# Patient Record
Sex: FEMALE | Race: BLACK OR AFRICAN AMERICAN | NOT HISPANIC OR LATINO | Employment: STUDENT | URBAN - METROPOLITAN AREA
[De-identification: names, ages, dates, MRNs, and addresses within clinical notes are randomized per-mention and may not be internally consistent; named-entity substitution may affect disease eponyms.]

---

## 2024-05-09 ENCOUNTER — HOSPITAL ENCOUNTER (EMERGENCY)
Facility: HOSPITAL | Age: 19
Discharge: HOME | End: 2024-05-09
Attending: EMERGENCY MEDICINE
Payer: COMMERCIAL

## 2024-05-09 ENCOUNTER — APPOINTMENT (EMERGENCY)
Dept: RADIOLOGY | Facility: HOSPITAL | Age: 19
End: 2024-05-09
Payer: COMMERCIAL

## 2024-05-09 VITALS
DIASTOLIC BLOOD PRESSURE: 62 MMHG | TEMPERATURE: 98.2 F | SYSTOLIC BLOOD PRESSURE: 128 MMHG | RESPIRATION RATE: 18 BRPM | OXYGEN SATURATION: 100 % | HEART RATE: 66 BPM

## 2024-05-09 DIAGNOSIS — R10.13 EPIGASTRIC PAIN: Primary | ICD-10-CM

## 2024-05-09 LAB
ALBUMIN SERPL BCP-MCNC: 4.3 G/DL (ref 3.5–5)
ALP SERPL-CCNC: 58 U/L (ref 34–104)
ALT SERPL W P-5'-P-CCNC: 8 U/L (ref 7–52)
ANION GAP SERPL CALCULATED.3IONS-SCNC: 7 MMOL/L (ref 4–13)
AST SERPL W P-5'-P-CCNC: 22 U/L (ref 13–39)
BASOPHILS # BLD AUTO: 0.03 THOUSANDS/ÂΜL (ref 0–0.1)
BASOPHILS NFR BLD AUTO: 0 % (ref 0–1)
BILIRUB SERPL-MCNC: 0.31 MG/DL (ref 0.2–1)
BILIRUB UR QL STRIP: NEGATIVE
BUN SERPL-MCNC: 9 MG/DL (ref 5–25)
CALCIUM SERPL-MCNC: 9.2 MG/DL (ref 8.4–10.2)
CHLORIDE SERPL-SCNC: 104 MMOL/L (ref 96–108)
CLARITY UR: CLEAR
CO2 SERPL-SCNC: 26 MMOL/L (ref 21–32)
COLOR UR: NORMAL
CREAT SERPL-MCNC: 0.71 MG/DL (ref 0.6–1.3)
EOSINOPHIL # BLD AUTO: 0.05 THOUSAND/ÂΜL (ref 0–0.61)
EOSINOPHIL NFR BLD AUTO: 1 % (ref 0–6)
ERYTHROCYTE [DISTWIDTH] IN BLOOD BY AUTOMATED COUNT: 13 % (ref 11.6–15.1)
EXT PREGNANCY TEST URINE: NEGATIVE
EXT. CONTROL: NORMAL
GFR SERPL CREATININE-BSD FRML MDRD: 123 ML/MIN/1.73SQ M
GLUCOSE SERPL-MCNC: 73 MG/DL (ref 65–140)
GLUCOSE UR STRIP-MCNC: NEGATIVE MG/DL
HCT VFR BLD AUTO: 42 % (ref 34.8–46.1)
HGB BLD-MCNC: 13.8 G/DL (ref 11.5–15.4)
HGB UR QL STRIP.AUTO: NEGATIVE
IMM GRANULOCYTES # BLD AUTO: 0.03 THOUSAND/UL (ref 0–0.2)
IMM GRANULOCYTES NFR BLD AUTO: 0 % (ref 0–2)
KETONES UR STRIP-MCNC: NEGATIVE MG/DL
LEUKOCYTE ESTERASE UR QL STRIP: NEGATIVE
LIPASE SERPL-CCNC: 18 U/L (ref 11–82)
LYMPHOCYTES # BLD AUTO: 1.93 THOUSANDS/ÂΜL (ref 0.6–4.47)
LYMPHOCYTES NFR BLD AUTO: 21 % (ref 14–44)
MCH RBC QN AUTO: 30.5 PG (ref 26.8–34.3)
MCHC RBC AUTO-ENTMCNC: 32.9 G/DL (ref 31.4–37.4)
MCV RBC AUTO: 93 FL (ref 82–98)
MONOCYTES # BLD AUTO: 0.67 THOUSAND/ÂΜL (ref 0.17–1.22)
MONOCYTES NFR BLD AUTO: 7 % (ref 4–12)
NEUTROPHILS # BLD AUTO: 6.29 THOUSANDS/ÂΜL (ref 1.85–7.62)
NEUTS SEG NFR BLD AUTO: 71 % (ref 43–75)
NITRITE UR QL STRIP: NEGATIVE
NRBC BLD AUTO-RTO: 0 /100 WBCS
PH UR STRIP.AUTO: 7 [PH]
PLATELET # BLD AUTO: 315 THOUSANDS/UL (ref 149–390)
PMV BLD AUTO: 9.9 FL (ref 8.9–12.7)
POTASSIUM SERPL-SCNC: 4.3 MMOL/L (ref 3.5–5.3)
PROT SERPL-MCNC: 7.7 G/DL (ref 6.4–8.4)
PROT UR STRIP-MCNC: NEGATIVE MG/DL
RBC # BLD AUTO: 4.53 MILLION/UL (ref 3.81–5.12)
SODIUM SERPL-SCNC: 137 MMOL/L (ref 135–147)
SP GR UR STRIP.AUTO: 1.02 (ref 1–1.03)
UROBILINOGEN UR QL STRIP.AUTO: 0.2 E.U./DL
WBC # BLD AUTO: 9 THOUSAND/UL (ref 4.31–10.16)

## 2024-05-09 PROCEDURE — 81025 URINE PREGNANCY TEST: CPT | Performed by: EMERGENCY MEDICINE

## 2024-05-09 PROCEDURE — 99285 EMERGENCY DEPT VISIT HI MDM: CPT | Performed by: EMERGENCY MEDICINE

## 2024-05-09 PROCEDURE — 83690 ASSAY OF LIPASE: CPT | Performed by: EMERGENCY MEDICINE

## 2024-05-09 PROCEDURE — 81003 URINALYSIS AUTO W/O SCOPE: CPT | Performed by: EMERGENCY MEDICINE

## 2024-05-09 PROCEDURE — 80053 COMPREHEN METABOLIC PANEL: CPT | Performed by: EMERGENCY MEDICINE

## 2024-05-09 PROCEDURE — 36415 COLL VENOUS BLD VENIPUNCTURE: CPT

## 2024-05-09 PROCEDURE — 99284 EMERGENCY DEPT VISIT MOD MDM: CPT

## 2024-05-09 PROCEDURE — 85025 COMPLETE CBC W/AUTO DIFF WBC: CPT | Performed by: EMERGENCY MEDICINE

## 2024-05-09 PROCEDURE — 71045 X-RAY EXAM CHEST 1 VIEW: CPT

## 2024-05-09 NOTE — Clinical Note
Loren Ramires was seen and treated in our emergency department on 5/9/2024.                Diagnosis:     Loren  .    She may return on this date: 05/11/2024         If you have any questions or concerns, please don't hesitate to call.      Og Parks MD    ______________________________           _______________          _______________  Hospital Representative                              Date                                Time

## 2024-05-09 NOTE — ED PROVIDER NOTES
"History  Chief Complaint   Patient presents with    Abdominal Pain     LUQ pain that radiates to back. States feels similar to when she had gallstones in the past      19yo f w/ previous hx of cholelithiasis s/p ERCP presents w/ epigastric pain.   She states that she was walking around a college campus w/ her friend when she began to develop a progressively worsening epigastric   \"Discomfort\" that she was unable to describe and radiated to her back. She walked back to the dorms and went to the bathroom and attempted to have a bowel movement w/o relief. Denies exacerbating or relieving factors. Had sushi 20-30 minutes prior to the episode, has had sushi in the past w/o issues. No previous episodes, but states similar sx in the past w/ cholelithiasis.   Sx have since resolved and currently asymptomatic.   Denies other medical issues or medications.   Denies sexual activity.   Last menses 2-3 weeks ago, irregular and unsure of intervals.   Denies alcohol use, tobacco use, IVDU.   Denies fevers, chills, CP, SOB, N/V, jaundice, rash, scleral icterus, diaphoresis, diarrhea, constipation, changes in urinary or bowel habits, changes in stool color.       Abdominal Pain      None       History reviewed. No pertinent past medical history.    History reviewed. No pertinent surgical history.    History reviewed. No pertinent family history.  I have reviewed and agree with the history as documented.    E-Cigarette/Vaping     E-Cigarette/Vaping Substances     Social History     Tobacco Use    Smoking status: Never    Smokeless tobacco: Never   Substance Use Topics    Alcohol use: Not Currently    Drug use: Not Currently        Review of Systems   Gastrointestinal:  Positive for abdominal pain.   All other systems reviewed and are negative.      Physical Exam  ED Triage Vitals [05/09/24 1502]   Temperature Pulse Respirations Blood Pressure SpO2   98.2 °F (36.8 °C) 66 18 128/62 100 %      Temp src Heart Rate Source Patient Position " - Orthostatic VS BP Location FiO2 (%)   -- Monitor Sitting Right arm --      Pain Score       --             Orthostatic Vital Signs  Vitals:    05/09/24 1502   BP: 128/62   Pulse: 66   Patient Position - Orthostatic VS: Sitting       Physical Exam  Vitals and nursing note reviewed.   Constitutional:       General: She is not in acute distress.     Appearance: Normal appearance. She is well-developed and normal weight. She is not ill-appearing, toxic-appearing or diaphoretic.   HENT:      Head: Normocephalic and atraumatic.      Right Ear: External ear normal.      Left Ear: External ear normal.      Nose: Nose normal.      Mouth/Throat:      Mouth: Mucous membranes are moist.      Pharynx: Oropharynx is clear.   Eyes:      General: No scleral icterus.        Right eye: No discharge.         Left eye: No discharge.      Extraocular Movements: Extraocular movements intact.   Cardiovascular:      Rate and Rhythm: Normal rate and regular rhythm.      Pulses: Normal pulses.      Heart sounds: Normal heart sounds. No murmur heard.  Pulmonary:      Effort: Pulmonary effort is normal. No respiratory distress.      Breath sounds: Normal breath sounds. No stridor. No wheezing, rhonchi or rales.   Chest:      Chest wall: No tenderness.   Abdominal:      General: Abdomen is flat. Bowel sounds are normal. There is no distension.      Palpations: Abdomen is soft. There is no hepatomegaly, splenomegaly, mass or pulsatile mass.      Tenderness: There is no abdominal tenderness. There is no right CVA tenderness, left CVA tenderness, guarding or rebound. Negative signs include Fitch's sign, Rovsing's sign and McBurney's sign.   Musculoskeletal:         General: No swelling, tenderness, deformity or signs of injury. Normal range of motion.      Cervical back: Neck supple.      Right lower leg: No edema.      Left lower leg: No edema.   Skin:     General: Skin is warm and dry.      Capillary Refill: Capillary refill takes less than 2  seconds.      Coloration: Skin is not cyanotic, jaundiced or pale.      Findings: No bruising, erythema, lesion, petechiae or rash.   Neurological:      General: No focal deficit present.      Mental Status: She is alert and oriented to person, place, and time. Mental status is at baseline.   Psychiatric:         Mood and Affect: Mood normal.         Behavior: Behavior normal.         ED Medications  Medications - No data to display    Diagnostic Studies  Results Reviewed       Procedure Component Value Units Date/Time    UA w Reflex to Microscopic w Reflex to Culture [[id number]] Collected: 05/09/24 1603    Lab Status: Final result Specimen: Urine, Clean Catch Updated: 05/09/24 1714     Color, UA Light Yellow     Clarity, UA Clear     Specific Gravity, UA 1.020     pH, UA 7.0     Leukocytes, UA Negative     Nitrite, UA Negative     Protein, UA Negative mg/dl      Glucose, UA Negative mg/dl      Ketones, UA Negative mg/dl      Urobilinogen, UA 0.2 E.U./dl      Bilirubin, UA Negative     Occult Blood, UA Negative    Comprehensive metabolic panel [[id number]] Collected: 05/09/24 1610    Lab Status: Final result Specimen: Blood from Arm, Right Updated: 05/09/24 1637     Sodium 137 mmol/L      Potassium 4.3 mmol/L      Chloride 104 mmol/L      CO2 26 mmol/L      ANION GAP 7 mmol/L      BUN 9 mg/dL      Creatinine 0.71 mg/dL      Glucose 73 mg/dL      Calcium 9.2 mg/dL      AST 22 U/L      ALT 8 U/L      Alkaline Phosphatase 58 U/L      Total Protein 7.7 g/dL      Albumin 4.3 g/dL      Total Bilirubin 0.31 mg/dL      eGFR 123 ml/min/1.73sq m     Narrative:      National Kid[employer] Foundation guidelines for Chronic Kidney Disease (CKD):     Stage 1 with normal or high GFR (GFR > 90 mL/min/1.73 square meters)    Stage 2 Mild CKD (GFR = 60-89 mL/min/1.73 square meters)    Stage 3A Moderate CKD (GFR = 45-59 mL/min/1.73 square meters)    Stage 3B Moderate CKD (GFR = 30-44 mL/min/1.73 square meters)    Stage 4 Severe CKD  "(GFR = 15-29 mL/min/1.73 square meters)    Stage 5 End Stage CKD (GFR <15 mL/min/1.73 square meters)  Note: GFR calculation is accurate only with a steady state creatinine    Lipase [[id number]]  (Normal) Collected: 05/09/24 1610    Lab Status: Final result Specimen: Blood from Arm, Right Updated: 05/09/24 1637     Lipase 18 u/L     CBC and differential [[id number]] Collected: 05/09/24 1610    Lab Status: Final result Specimen: Blood from Arm, Right Updated: 05/09/24 1626     WBC 9.00 Thousand/uL      RBC 4.53 Million/uL      Hemoglobin 13.8 g/dL      Hematocrit 42.0 %      MCV 93 fL      MCH 30.5 pg      MCHC 32.9 g/dL      RDW 13.0 %      MPV 9.9 fL      Platelets 315 Thousands/uL      nRBC 0 /100 WBCs      Segmented % 71 %      Immature Grans % 0 %      Lymphocytes % 21 %      Monocytes % 7 %      Eosinophils Relative 1 %      Basophils Relative 0 %      Absolute Neutrophils 6.29 Thousands/µL      Absolute Immature Grans 0.03 Thousand/uL      Absolute Lymphocytes 1.93 Thousands/µL      Absolute Monocytes 0.67 Thousand/µL      Eosinophils Absolute 0.05 Thousand/µL      Basophils Absolute 0.03 Thousands/µL     POCT pregnancy, urine [[id number]]  (Normal) Resulted: 05/09/24 1606    Lab Status: Final result Updated: 05/09/24 1606     EXT Preg Test, Ur Negative     Control Valid                   XR chest 1 view portable   ED Interpretation by Og Parks MD (05/09 1735)   No acute cardiopulmonary process. No free air under the diaphragm.             Procedures  Procedures      ED Course         CRAFFT      Flowsheet Row Most Recent Value   CRAFFT Initial Screen: During the past 12 months, did you:    1. Drink any alcohol (more than a few sips)?  No Filed at: 05/09/2024 1502   2. Smoke any marijuana or hashish No Filed at: 05/09/2024 1502   3. Use anything else to get high? (\"anything else\" includes illegal drugs, over the counter and prescription drugs, and things that you sniff or 'parra')? No Filed at: " 05/09/2024 1502                                      Medical Decision Making  17 yo f presents w/ epigastric abdominal pain.   Previous hx of cholelithiasis w/ ERCP per patient.   Patient is currently asx   Abdomen is sntx4 w/ no peritoneal signs  Ddx includes but not limited to cholelithiasis, cholecystitis, pregnancy, menses, food poisoning, heat related illness, cystitis, food poisoning, gastritis    Cbc, cmp, preg, and UA normal  CXR showed no acute cardiopulmonary process.   No peritoneal signs    Patient discharged home to self care  w/ strict return precautions continued or worsening sx.   Serial abdominal exams at home.   Patient understanding and in agreement w/ plan.     Amount and/or Complexity of Data Reviewed  Labs: ordered.  Radiology: ordered.          Disposition  Final diagnoses:   Epigastric pain     Time reflects when diagnosis was documented in both MDM as applicable and the Disposition within this note       Time User Action Codes Description Comment    5/9/2024  5:31 PM Og Parks Add [R10.13] Epigastric pain           ED Disposition       ED Disposition   Discharge    Condition   Stable    Date/Time   Thu May 9, 2024 1731    Comment   Loren Ramires discharge to home/self care.                   Follow-up Information       Follow up With Specialties Details Why Contact Info Additional Information    [facility] Internal Medicine Schedule an appointment as soon as possible for a visit  As needed [address] [facility] [address] [phone] [facility] [place], [address], [place], [id number] [phone]    [facility] Emergency Department Emergency Medicine Go to  If symptoms worsen [facility] [id number] [phone] [facility] Emergency Department, [address], [place], [address]            Patient's Medications    No medications  on file     No discharge procedures on file.    PDMP Review       None             ED Provider  Attending physically available and evaluated Loren Ramires. I managed the patient along with the ED Attending.    Electronically Signed by           Og Parks MD  05/09/24 0968

## 2024-05-12 NOTE — ED ATTENDING ATTESTATION
5/9/2024  ISeb MD, saw and evaluated the patient. I have discussed the patient with the resident/non-physician practitioner and agree with the resident's/non-physician practitioner's findings, Plan of Care, and MDM as documented in the resident's/non-physician practitioner's note, except where noted. All available labs and Radiology studies were reviewed.  I was present for key portions of any procedure(s) performed by the resident/non-physician practitioner and I was immediately available to provide assistance.       At this point I agree with the current assessment done in the Emergency Department.  I have conducted an independent evaluation of this patient a history and physical is as follows:see  h and p above     ED Course  ED Course as of 05/12/24 1221   Thu May 09, 2024   1652 Er md note- pt seen and thoroughly evaluated by er md - case d/w er resident - 18 yr female approx 3 hrs ago with  aching luq pain that worsened  but has now gone-- pt has chronic belching- no increase in gerd/dyspepsia/ progressive dsyphaghia/ vomitus/ melena-- no fevers/ uri/cough/cp/sob- no injury no rash in area- no gu/gyn comps- no change in stools-- no recent ruq rad to back after meals- -currently pt is asymptomatic- avss- very well appearing in  nad- pulse ox 100 % on ra- interpretation is normal- no intervention- pt  on cell phone- rrr s1/s2/-cta-b/soft nt/nd- no hsm - n ocva tenderness- no peritoneal signs- normal/equal bilateral radial/dp pulses- no ble edema/calf tenderness/asym/ erythema- normal non focal neuro exam    1734 Cxr portable- no old cxr for comparison - normal mediastinum/cardiac silhouette- no free/sq air- no infiltrate- ptx- pulm edema- pleural effusions   1736 Er md medical decision making note- pt is low risk for pe by wells score- score of 0-perc-neg         Critical Care Time  Procedures